# Patient Record
Sex: FEMALE
[De-identification: names, ages, dates, MRNs, and addresses within clinical notes are randomized per-mention and may not be internally consistent; named-entity substitution may affect disease eponyms.]

---

## 2020-10-18 ENCOUNTER — NURSE TRIAGE (OUTPATIENT)
Dept: OTHER | Facility: CLINIC | Age: 50
End: 2020-10-18

## 2020-10-18 NOTE — TELEPHONE ENCOUNTER
Reason for Disposition   [1] Limp when walking AND [2] due to a direct blow or crushing injury    Answer Assessment - Initial Assessment Questions  1. MECHANISM: \"How did the injury happen? \" (e.g., twisting injury, direct blow)       Stepped on a needle in her carpet. Thinks it went in about 1/2 inch. 2. ONSET: \"When did the injury happen? \" (Minutes or hours ago)       This afternoon    3. LOCATION: \"Where is the injury located? \"       Bottom of right foot    4. APPEARANCE of INJURY: \"What does the injury look like? \"       Initially had some bleeding which has stopped. 5. WEIGHT-BEARING: \"Can you put weight on that foot? \" \"Can you walk (four steps or more)? \"        Walks with a limp    6. SIZE: For cuts, bruises, or swelling, ask: \"How large is it? \" (e.g., inches or centimeters;  entire joint)       [place]    7. PAIN: \"Is there pain? \" If so, ask: \"How bad is the pain? \"    (e.g., Scale 1-10; or mild, moderate, severe)      Pain with walking 7/10. She can walk with a limp. 8. TETANUS: For any breaks in the skin, ask: \"When was the last tetanus booster? \"      <5 years    9. OTHER SYMPTOMS: \"Do you have any other symptoms? \"       Denies    10. PREGNANCY: \"Is there any chance you are pregnant? \" \"When was your last menstrual period? \"        NA    Protocols used: ANKLE AND FOOT INJURY-ADULT-    Patient called [facility]. See above questions and answers. Caller talking full sentences without any distress on phone. Discussed disposition and patient agreeable. Discussed potential consequences for not following disposition recommendation. Aware to call back with any concerns or persistent, worsening, or new symptoms develop. Attention provider: Your patient utilized nurse triage services offered by employer, payer or community. This encounter includes an overview of the reason for call, assessment and recommended disposition.  Please do not respond through this encounter as the